# Patient Record
Sex: MALE | Race: WHITE | URBAN - METROPOLITAN AREA
[De-identification: names, ages, dates, MRNs, and addresses within clinical notes are randomized per-mention and may not be internally consistent; named-entity substitution may affect disease eponyms.]

---

## 2023-09-29 ENCOUNTER — APPOINTMENT (RX ONLY)
Dept: URBAN - METROPOLITAN AREA CLINIC 74 | Facility: CLINIC | Age: 53
Setting detail: DERMATOLOGY
End: 2023-09-29

## 2023-09-29 DIAGNOSIS — L71.8 OTHER ROSACEA: ICD-10-CM | Status: INADEQUATELY CONTROLLED

## 2023-09-29 PROCEDURE — ? MDM - TREATMENT GOALS

## 2023-09-29 PROCEDURE — 99204 OFFICE O/P NEW MOD 45 MIN: CPT

## 2023-09-29 PROCEDURE — ? FULL BODY SKIN EXAM - DECLINED

## 2023-09-29 PROCEDURE — ? PRESCRIPTION

## 2023-09-29 PROCEDURE — ? PRESCRIPTION MEDICATION MANAGEMENT

## 2023-09-29 PROCEDURE — ? COUNSELING

## 2023-09-29 RX ORDER — SODIUM SULFACETAMIDE 100 MG/ML
APPLY LIQUID TOPICAL QD
Qty: 480 | Refills: 4 | Status: ERX | COMMUNITY
Start: 2023-09-29

## 2023-09-29 RX ORDER — DOXYCYCLINE HYCLATE 100 MG/1
1 CAPSULE, GELATIN COATED ORAL QD
Qty: 30 | Refills: 3 | Status: ERX | COMMUNITY
Start: 2023-09-29

## 2023-09-29 RX ORDER — PHARMACY COMPOUNDING ACCESSORY
EACH MISCELLANEOUS QAM
Qty: 30 | Refills: 3 | Status: ERX | COMMUNITY
Start: 2023-09-29

## 2023-09-29 RX ADMIN — SODIUM SULFACETAMIDE APPLY: 100 LIQUID TOPICAL at 00:00

## 2023-09-29 RX ADMIN — DOXYCYCLINE HYCLATE 1: 100 CAPSULE, GELATIN COATED ORAL at 00:00

## 2023-09-29 RX ADMIN — Medication: at 00:00

## 2023-09-29 ASSESSMENT — LOCATION SIMPLE DESCRIPTION DERM
LOCATION SIMPLE: NOSE
LOCATION SIMPLE: LEFT FOREHEAD

## 2023-09-29 ASSESSMENT — LOCATION ZONE DERM
LOCATION ZONE: FACE
LOCATION ZONE: NOSE

## 2023-09-29 ASSESSMENT — LOCATION DETAILED DESCRIPTION DERM
LOCATION DETAILED: NASAL DORSUM
LOCATION DETAILED: LEFT MEDIAL FOREHEAD

## 2023-09-29 NOTE — PROCEDURE: PRESCRIPTION MEDICATION MANAGEMENT
Continue Regimen: SPF 45 daily \\n\\nNon comedogenic moisturizer
Initiate Treatment: doxycycline hyclate 100 mg capsule QD\\nQuantity: 30.0 Capsule  Days Supply: 30\\nSig: Take one pill once QD with food\\n\\nSodium Sulfacetamide wash 10% apply lather rinse daily\\n\\nMedrock rosacea extra with azelaic acid daily to twice daily for flare
Detail Level: Zone
Render In Strict Bullet Format?: No
Samples Given: Amanda hong say double repair moisturizer \\nISDIN spf